# Patient Record
Sex: FEMALE | Race: AMERICAN INDIAN OR ALASKA NATIVE
[De-identification: names, ages, dates, MRNs, and addresses within clinical notes are randomized per-mention and may not be internally consistent; named-entity substitution may affect disease eponyms.]

---

## 2020-06-18 ENCOUNTER — HOSPITAL ENCOUNTER (EMERGENCY)
Dept: HOSPITAL 5 - ED | Age: 65
Discharge: HOME | End: 2020-06-18
Payer: SELF-PAY

## 2020-06-18 VITALS — DIASTOLIC BLOOD PRESSURE: 59 MMHG | SYSTOLIC BLOOD PRESSURE: 113 MMHG

## 2020-06-18 DIAGNOSIS — Z98.51: ICD-10-CM

## 2020-06-18 DIAGNOSIS — M94.0: Primary | ICD-10-CM

## 2020-06-18 DIAGNOSIS — R07.9: ICD-10-CM

## 2020-06-18 DIAGNOSIS — I10: ICD-10-CM

## 2020-06-18 LAB
ALBUMIN SERPL-MCNC: 4.3 G/DL (ref 3.9–5)
ALT SERPL-CCNC: 30 UNITS/L (ref 7–56)
APTT BLD: 28.2 SEC. (ref 24.2–36.6)
BASOPHILS # (AUTO): 0 K/MM3 (ref 0–0.1)
BASOPHILS NFR BLD AUTO: 0.9 % (ref 0–1.8)
BILIRUB DIRECT SERPL-MCNC: < 0.2 MG/DL (ref 0–0.2)
BUN SERPL-MCNC: 18 MG/DL (ref 7–17)
BUN/CREAT SERPL: 26 %
CALCIUM SERPL-MCNC: 9.9 MG/DL (ref 8.4–10.2)
EOSINOPHIL # BLD AUTO: 0.2 K/MM3 (ref 0–0.4)
EOSINOPHIL NFR BLD AUTO: 4 % (ref 0–4.3)
HCT VFR BLD CALC: 42.7 % (ref 30.3–42.9)
HEMOLYSIS INDEX: 18
HGB BLD-MCNC: 14.1 GM/DL (ref 10.1–14.3)
INR PPP: 0.95 (ref 0.87–1.13)
LYMPHOCYTES # BLD AUTO: 1.3 K/MM3 (ref 1.2–5.4)
LYMPHOCYTES NFR BLD AUTO: 29.1 % (ref 13.4–35)
MCHC RBC AUTO-ENTMCNC: 33 % (ref 30–34)
MCV RBC AUTO: 91 FL (ref 79–97)
MONOCYTES # (AUTO): 0.4 K/MM3 (ref 0–0.8)
MONOCYTES % (AUTO): 9.5 % (ref 0–7.3)
PLATELET # BLD: 215 K/MM3 (ref 140–440)
RBC # BLD AUTO: 4.71 M/MM3 (ref 3.65–5.03)

## 2020-06-18 PROCEDURE — 83880 ASSAY OF NATRIURETIC PEPTIDE: CPT

## 2020-06-18 PROCEDURE — 93005 ELECTROCARDIOGRAM TRACING: CPT

## 2020-06-18 PROCEDURE — 71045 X-RAY EXAM CHEST 1 VIEW: CPT

## 2020-06-18 PROCEDURE — 84484 ASSAY OF TROPONIN QUANT: CPT

## 2020-06-18 PROCEDURE — 36415 COLL VENOUS BLD VENIPUNCTURE: CPT

## 2020-06-18 PROCEDURE — 85025 COMPLETE CBC W/AUTO DIFF WBC: CPT

## 2020-06-18 PROCEDURE — 80076 HEPATIC FUNCTION PANEL: CPT

## 2020-06-18 PROCEDURE — 85730 THROMBOPLASTIN TIME PARTIAL: CPT

## 2020-06-18 PROCEDURE — 80048 BASIC METABOLIC PNL TOTAL CA: CPT

## 2020-06-18 PROCEDURE — 85610 PROTHROMBIN TIME: CPT

## 2020-06-18 NOTE — XRAY REPORT
CHEST 1 VIEW 



INDICATION:  Chest Pain.



COMPARISON:  None



FINDINGS:

Support devices: None. 



Heart: Within normal limits. 

Lungs/Pleura: No acute air space or interstitial disease. 



Additional findings: None.



IMPRESSION:

 No acute findings.



Signer Name: Dheeraj Bearden Jr, MD 

Signed: 6/18/2020 1:21 PM

Workstation Name: [id number]

## 2020-06-18 NOTE — EVENT NOTE
ED Screening Note


Date of service: 06/18/20 (nn)


Time: 12:59


ED Screening Note: 





This is a 64-year-old female presents the ED complaining of chest tightness, 

shortness of breath and productive coughing intermittently for the past couple 

weeks.





This initial assessment/diagnostic orders/clinical plan/treatment(s) is/are 

subject to change based on patients health status, clinical progression and re-

assessment by fellow clinical providers in the ED. Further treatment and workup 

at subsequent clinical providers discretion. Patient/guardian urged not to elope

from the ED as their condition may be serious if not clinically assessed and 

managed. 





Initial orders include: 


ekg, labs, cxr


main side eval

## 2020-06-18 NOTE — EMERGENCY DEPARTMENT REPORT
ED Chest Pain HPI





- General


Chief Complaint: Chest Pain


Stated Complaint: CHEST PAIN/COUGHING


Time Seen by Provider: 06/18/20 13:26


Source: patient


Mode of arrival: Ambulatory


Limitations: No Limitations





- History of Present Illness


Initial Comments: 





Patient is 64 years old female with history of hypertension.  Patient presented 

to the ER complaining of substernal chest pain for approximately 1 month now.  

Patient described her pain as aching and soreness.  Patient stated that pain 

increases when she started coughing.  Patient stated that she has been coughing 

greenish sputum.  Patient stated pain does not radiate.  She denied any 

shortness of breath.  Patient also denied any fever or chills.  Patient also 

denied any contact with patient with COVID-19.


MD Complaint: chest pain


-: month(s)


Onset: during rest


Pain Location: substernal


Quality: aching


Consistency: intermittent


Other Symptoms: cough





- Related Data


                                    Allergies











Allergy/AdvReac Type Severity Reaction Status Date / Time


 


No Known Allergies Allergy   Unverified 06/18/20 12:43














Heart Score





- HEART Score


History: Slightly suspicious


EKG: Non-specific


Age: 45-65


Risk factors: 1-2 risk factors


Troponin: < normal limit


HEART Score: 3





- Critical Actions


Critical Actions: 0-3 pts:0.9-1.7%risk of adverse cardiac event.Candidate for 

discharge





ED Review of Systems


ROS: 


Stated complaint: CHEST PAIN/COUGHING


Other details as noted in HPI





Comment: All other systems reviewed and negative


Constitutional: denies: chills, fever


Respiratory: cough.  denies: shortness of breath, SOB with exertion, SOB at 

rest, wheezing


Cardiovascular: chest pain.  denies: palpitations, dyspnea on exertion


Gastrointestinal: denies: abdominal pain, nausea, vomiting, diarrhea, 

constipation, hematemesis, melena, hematochezia


Musculoskeletal: denies: back pain


Neurological: denies: headache, weakness





ED Past Medical Hx





- Past Medical History


Previous Medical History?: Yes


Hx Hypertension: Yes





- Surgical History


Past Surgical History?: Yes


Additional Surgical History: tubal ligation





- Social History


Smoking Status: Never Smoker


Substance Use Type: None





ED Physical Exam





- General


Limitations: No Limitations


General appearance: alert, in no apparent distress





- Head


Head exam: Present: atraumatic, normocephalic, normal inspection





- Eye


Eye exam: Present: normal appearance, PERRL





- ENT


ENT exam: Present: normal exam, normal orophraynx, mucous membranes moist





- Neck


Neck exam: Present: normal inspection, full ROM.  Absent: tenderness, 

meningismus, lymphadenopathy, thyromegaly





- Respiratory


Respiratory exam: Present: normal lung sounds bilaterally, chest wall tenderness





- Cardiovascular


Cardiovascular Exam: Present: regular rate, normal rhythm, normal heart sounds





- GI/Abdominal


GI/Abdominal exam: Present: soft, normal bowel sounds.  Absent: distended, 

tenderness, guarding, rebound, rigid, organomegaly, mass, bruit, pulsatile mass,

hernia





- Extremities Exam


Extremities exam: Present: normal inspection, full ROM, normal capillary refill.

 Absent: pedal edema, calf tenderness





- Back Exam


Back exam: Present: normal inspection, full ROM.  Absent: CVA tenderness (R), 

CVA tenderness (L), muscle spasm, paraspinal tenderness, vertebral tenderness





- Neurological Exam


Neurological exam: Present: alert, oriented X3, CN II-XII intact, normal gait, 

reflexes normal.  Absent: motor sensory deficit





- Psychiatric


Psychiatric exam: Present: normal mood





- Skin


Skin exam: Present: warm, intact, normal color





ED Course


                                   Vital Signs











  06/18/20 06/18/20 06/18/20





  12:43 13:40 13:49


 


Temperature 97.9 F  


 


Pulse Rate 70  65


 


Respiratory 18  15





Rate   


 


Blood Pressure 152/83 159/80 


 


Blood Pressure   159/80





[Left]   


 


O2 Sat by Pulse 100 100 100





Oximetry   














  06/18/20 06/18/20 06/18/20





  13:50 14:00 15:00


 


Temperature   


 


Pulse Rate  69 61


 


Respiratory 17 14 16





Rate   


 


Blood Pressure  159/80 126/103


 


Blood Pressure   





[Left]   


 


O2 Sat by Pulse  100 100





Oximetry   














  06/18/20 06/18/20





  15:39 16:00


 


Temperature  


 


Pulse Rate  60


 


Respiratory  16





Rate  


 


Blood Pressure  138/73


 


Blood Pressure 133/95 





[Left]  


 


O2 Sat by Pulse  100





Oximetry  














ED Medical Decision Making





- Lab Data


Result diagrams: 


                                 06/18/20 13:39





                                 06/18/20 13:39





- EKG Data


-: EKG Interpreted by Me


EKG shows normal: sinus rhythm


Rate: normal





- EKG Data


Interpretation: no acute changes





- Radiology Data


Radiology results: report reviewed





- Medical Decision Making





Patient is 64 years old female with history of hypertension.  Patient presented 

to the ER complaining of substernal chest pain for approximately 1 month now.  

Patient described her pain as aching and soreness.  Patient stated that pain 

increases when she started coughing.  Patient stated that she has been coughing 

greenish sputum.  Patient stated pain does not radiate.  She denied any 

shortness of breath.  Patient also denied any fever or chills.  Patient also 

denied any contact with patient with COVID-19.





EKG showed no ST elevation or depression.  Labs reviewed and is unremarkable 

including 2- troponin.  Patient chest pain is reproducible and it has been going

 on for more than a month now.  Patient also reported cough and increasing of 

the pain when she cough.  I believe patient pain is most likely musculoskeletal 

due to chronic cough however cardiac origin cannot be excluded and patient 

strongly advised to follow-up with her primary care physician for outpatient 

cardiac work-up.  Patient given medication for cough and pain and advised to 

return to the ER if she develop any new symptoms or if her symptoms get worse.


Critical care attestation.: 


If time is entered above; I have spent that time in minutes in the direct care 

of this critically ill patient, excluding procedure time.








ED Disposition


Clinical Impression: 


 Chest pain, Costochondritis, acute





Disposition: DC-01 TO HOME OR SELFCARE


Is pt being admited?: No


Condition: Stable


Instructions:  Chest Pain (ED), Costochondritis (ED)


Referrals: 


PRIMARY CARE,MD [Primary Care Provider] - 3-5 Days

## 2021-05-23 ENCOUNTER — HOSPITAL ENCOUNTER (EMERGENCY)
Dept: HOSPITAL 5 - ED | Age: 66
Discharge: HOME | End: 2021-05-23
Payer: COMMERCIAL

## 2021-05-23 VITALS — DIASTOLIC BLOOD PRESSURE: 79 MMHG | SYSTOLIC BLOOD PRESSURE: 169 MMHG

## 2021-05-23 DIAGNOSIS — Z98.51: ICD-10-CM

## 2021-05-23 DIAGNOSIS — R60.0: Primary | ICD-10-CM

## 2021-05-23 DIAGNOSIS — I10: ICD-10-CM

## 2021-05-23 DIAGNOSIS — Z79.899: ICD-10-CM

## 2021-05-23 LAB
ALBUMIN SERPL-MCNC: 4.5 G/DL (ref 3.9–5)
ALT SERPL-CCNC: 24 UNITS/L (ref 7–56)
APTT BLD: 32.1 SEC. (ref 24.2–36.6)
BASOPHILS # (AUTO): 0.1 K/MM3 (ref 0–0.1)
BASOPHILS NFR BLD AUTO: 0.9 % (ref 0–1.8)
BUN SERPL-MCNC: 15 MG/DL (ref 7–17)
BUN/CREAT SERPL: 21 %
CALCIUM SERPL-MCNC: 9.5 MG/DL (ref 8.4–10.2)
EOSINOPHIL # BLD AUTO: 0.3 K/MM3 (ref 0–0.4)
EOSINOPHIL NFR BLD AUTO: 4.5 % (ref 0–4.3)
HCT VFR BLD CALC: 40.1 % (ref 30.3–42.9)
HEMOLYSIS INDEX: 5
HGB BLD-MCNC: 13.5 GM/DL (ref 10.1–14.3)
INR PPP: 1.03 (ref 0.87–1.13)
LYMPHOCYTES # BLD AUTO: 1.5 K/MM3 (ref 1.2–5.4)
LYMPHOCYTES NFR BLD AUTO: 23.4 % (ref 13.4–35)
MCHC RBC AUTO-ENTMCNC: 34 % (ref 30–34)
MCV RBC AUTO: 91 FL (ref 79–97)
MONOCYTES # (AUTO): 0.6 K/MM3 (ref 0–0.8)
MONOCYTES % (AUTO): 9.6 % (ref 0–7.3)
PLATELET # BLD: 240 K/MM3 (ref 140–440)
RBC # BLD AUTO: 4.4 M/MM3 (ref 3.65–5.03)

## 2021-05-23 PROCEDURE — 80053 COMPREHEN METABOLIC PANEL: CPT

## 2021-05-23 PROCEDURE — 85025 COMPLETE CBC W/AUTO DIFF WBC: CPT

## 2021-05-23 PROCEDURE — 84484 ASSAY OF TROPONIN QUANT: CPT

## 2021-05-23 PROCEDURE — 85610 PROTHROMBIN TIME: CPT

## 2021-05-23 PROCEDURE — 36415 COLL VENOUS BLD VENIPUNCTURE: CPT

## 2021-05-23 PROCEDURE — 83880 ASSAY OF NATRIURETIC PEPTIDE: CPT

## 2021-05-23 PROCEDURE — 85730 THROMBOPLASTIN TIME PARTIAL: CPT

## 2021-05-23 PROCEDURE — 71046 X-RAY EXAM CHEST 2 VIEWS: CPT

## 2021-05-23 NOTE — EMERGENCY DEPARTMENT REPORT
ED Extremity Problem HPI





- General


Chief complaint: Extremity Injury, Lower


Stated complaint: LEGS HAVE FLUID ON THEM


Time Seen by Provider: 21 15:25


Source: patient


Mode of arrival: Ambulatory


Limitations: No Limitations





- History of Present Illness


Initial comments: 


65-year-old female with a past medical history of hypertension and chronic 

peripheral edema presented to the ER this morning around 11:00 with complaints 

of bilateral lower extremity swelling.  Patient's states that she had increasing

swelling to her a lower extremity over the past 3 days.  She reports heaviness 

to both legs but otherwise no significant pain.  Patient also reports that she 

has been out of her blood pressure medication for the past 2 days.  She did call

to get it refilled but has not picked it up yet she states that she came to the 

ER first to have the swelling in her legs checked out as she has never had it 

swell as much as it has in the past 3 days.  She denies any associated chest 

pain or shortness of breath.  She denies any GI or  symptoms.  She denies any 

history of congestive heart failure, history of PE or DVT, or kidney disease. 





MD Complaint: extremity swelling


-: days(s) (3 )


Location: bilateral lower extremity


History of Same: Yes





- Related Data


                                  Previous Rx's











 Medication  Instructions  Recorded  Last Taken  Type


 


Naproxen [Naprosyn] 500 mg PO BID #14 tablet 20 Unknown Rx


 


guaiFENesin [Robitussin] 10 ml PO TID PRN #100 ml 20 Unknown Rx











                                    Allergies











Allergy/AdvReac Type Severity Reaction Status Date / Time


 


No Known Allergies Allergy   Unverified 20 12:43














ED Review of Systems


ROS: 


Stated complaint: LEGS HAVE FLUID ON THEM


Other details as noted in HPI





Comment: All other systems reviewed and negative


Constitutional: denies: chills, fever


Eyes: denies: eye pain, eye discharge, vision change


ENT: denies: ear pain, throat pain, hearing loss, epistaxis, congestion


Respiratory: denies: cough, shortness of breath, SOB with exertion, SOB at rest,

 wheezing


Cardiovascular: edema.  denies: chest pain, palpitations, dyspnea on exertion, 

syncope, paroxysmal nocturnal dyspnea


Gastrointestinal: denies: abdominal pain, nausea, vomiting, diarrhea, 

constipation, hematemesis, hematochezia


Genitourinary: denies: urgency, dysuria, frequency, hematuria, discharge, 

abnormal menses, dyspareunia


Musculoskeletal: denies: back pain, joint swelling, arthralgia


Skin: denies: rash, lesions, change in color, change in hair/nails, pruritus


Neurological: denies: headache, numbness, paresthesias, confusion, abnormal 

gait, vertigo


Psychiatric: denies: anxiety, depression, auditory hallucinations, visual 

hallucinations, homicidal thoughts, suicidal thoughts


Hematological/Lymphatic: denies: easy bleeding, easy bruising, swollen glands





ED Past Medical Hx





- Past Medical History


Previous Medical History?: Yes


Hx Hypertension: Yes





- Surgical History


Additional Surgical History: tubal ligation





- Social History


Smoking Status: Never Smoker


Substance Use Type: None





- Medications


Home Medications: 


                                Home Medications











 Medication  Instructions  Recorded  Confirmed  Last Taken  Type


 


Naproxen [Naprosyn] 500 mg PO BID #14 tablet 20  Unknown Rx


 


guaiFENesin [Robitussin] 10 ml PO TID PRN #100 ml 20  Unknown Rx














ED Physical Exam





- General


Limitations: No Limitations


General appearance: alert, in no apparent distress





- Head


Head exam: Present: atraumatic, normocephalic, normal inspection





- Eye


Eye exam: Present: normal appearance, PERRL, EOMI


Pupils: Present: normal accommodation





- Respiratory


Respiratory exam: Present: normal lung sounds bilaterally.  Absent: respiratory 

distress, wheezes, rales, rhonchi





- Cardiovascular


Cardiovascular Exam: Present: regular rate, normal rhythm, normal heart sounds





- GI/Abdominal


GI/Abdominal exam: Present: soft.  Absent: distended, tenderness, guarding, 

rebound





- Extremities Exam


Extremities exam: Present: full ROM, normal capillary refill, pedal edema, joint

 swelling, other (pt has mild soft swelling noted to both legs; no pitting; no 

ttp to legs or calf. She does have multiple varicose veins noted to the lower 

extremities.  No erythema or bruising or any other skin discoloration noted.  

Dorsalis pedis pulse and posterior status pulse normal.  Cap refills normal).  

Absent: calf tenderness (.)





- Neurological Exam


Neurological exam: Present: alert, oriented X3, CN II-XII intact, normal gait





- Psychiatric


Psychiatric exam: Present: normal affect, normal mood





- Skin


Skin exam: Present: intact





ED Course


                                   Vital Signs











  21





  15:12


 


Temperature 98.3 F


 


Pulse Rate 67


 


Respiratory 18





Rate 


 


Blood Pressure 169/79


 


O2 Sat by Pulse 99





Oximetry 














ED Medical Decision Making





- Lab Data


Result diagrams: 


                                 21 15:46





                                 21 15:46





- Radiology Data


Radiology results: report reviewed


Patient: FREDERICK MONTERO                                                      

          MR#:   


[id number]          


: 1955                                                                

Acct:[id number]      


 


Age/Sex: 65 / F                                                                

ADM Date: 21     


 


Loc: ED       


Attending Dr:   


 


 


Ordering Physician: ANASTASIA PATEL NP  


Date of Service: 21  


Procedure(s): XR chest routine 2V  


Accession Number(s): [id number]  


 


cc: ANASTASIA PATEL NP   


 


Fluoro Time In Minutes:   


 


CHEST 2 VIEWS   


 


 INDICATION / CLINICAL INFORMATION:  


 Chest Pain.  


 


 COMPARISON:   


 2020.  


 


 FINDINGS:  


 


 SUPPORT DEVICES: None.  


 


 HEART / MEDIASTINUM: No significant abnormality.   


 


 LUNGS / PLEURA: No significant pulmonary or pleural abnormality. No 

pneumothorax.   


 


 ADDITIONAL FINDINGS: No significant additional findings.  


 


 IMPRESSION:  


 No acute cardiopulmonary abnormality.  


 


 Signer Name: Yovani Kramer MD   


 Signed: 2021 3:51 PM  


 Workstation Name: [id number]   


 


 


Transcribed By: SS  


Dictated By: YOVANI KRAMER  


Electronically Authenticated By: YOVANI KRAMER    


Signed Date/Time: 21 155                                


 


 


 


DD/DT: 21                                                            

  


TD/TT:








- Medical Decision Making


Patient presented to the ER this morning around 11 AM with complaints of 

bilateral lower extremity swelling.  She has chronic peripheral edema but 

noticed in the past 3 days it has increased more than typical.  She reports 

heaviness to the leg secondary to the swelling but otherwise no pain and she has

 not had any shortness of breath or chest pain.  


Labs were ordered as part of screening exam.


Labs reviewed and shows nothing acute.


Chest x-ray shows nothing acute.


Physical exam shows that patient do have large legs but she does have associated

 mild soft swelling to both legs with associated varicose veins to both legs but

 no calf tenderness or any tenderness to her lower leg, she has no associated 

cellulitis, or any other skin discoloration.  Distal pulses are normal.  Her 

gait is slow but normal.  


Her history, physical exam and diagnostic testing does not suggest acute 

arterial occlusion, DVT, cellulitis, or other bacterial infection, CHF, renal 

failure or any other acute emergent conditions requiring additional work-up or 

admission at this time.


Patient is not toxic or ill-appearing.  She is not in any acute distress.  

Repeat blood pressure showed some improvement at 159/75. Patient is requesting a

 dose of her blood pressure medication before she leaves here tonight.  She does

 have a refill at the pharmacy but will be able to pick it up tomorrow repeat.  

She was given a dose of the HCTZ and the lisinopril tonight and was given the 

metoprolol to take in the morning but she was instructed to get her medications 

filled and start taking as prescribed and she was also instructed to elevate her

 leg as much as possible. 


Patient will be given referral to local primary care doctor.  Patient was stable

 at time of discharge.


Critical care attestation.: 


If time is entered above; I have spent that time in minutes in the direct care 

of this critically ill patient, excluding procedure time.








ED Disposition


Clinical Impression: 


 Peripheral edema





Disposition: DC-01 TO HOME OR SELFCARE


Is pt being admited?: No


Does the pt Need Aspirin: No


Condition: Stable


Instructions:  Peripheral Edema


Additional Instructions: 


I recommend that you getting your blood pressure medication tomorrow from 

pharmacy and start taking it as prescribed. Elevate your leg as often as 

possible. Return to ED if symptoms changes or worsen in any way. 


Referrals: 


DAV BYRANT MD [Staff Physician] - 3-5 Days


PRIMARY CARE,MD [Primary Care Provider] - 3-5 Days


Time of Disposition: 21:25

## 2021-05-23 NOTE — EMERGENCY DEPARTMENT REPORT
Blank Doc





- Documentation


Documentation: 





65-year-old female that presents with bilateral leg swelling.  Patient stated is

not compliant with her medication.








1- This initial assessment/diagnostic orders/clinical plan/ treatment(s) is/are 

subject to change based on pt's health status, clinical progression and re-asses

sment by fellow clinical providers in the ED. Further treatment and workup at 

subsequent clinical provers discretion. Patient/guardians urged not to elope 

from ED as their condition may be serious if not clinically assessed and 

managed. 


2-labs with EKG

## 2021-05-23 NOTE — EMERGENCY DEPARTMENT REPORT
ED General Adult HPI





- General


Chief complaint: Extremity Injury, Lower


Stated complaint: LEGS HAVE FLUID ON THEM


Time Seen by Provider: 05/23/21 15:25


Source: patient


Mode of arrival: Ambulatory


Limitations: No Limitations





- History of Present Illness


MD Complaint: Bilateral LE swelling 


-: days(s) (3)





- Related Data


                                  Previous Rx's











 Medication  Instructions  Recorded  Last Taken  Type


 


Naproxen [Naprosyn] 500 mg PO BID #14 tablet 06/18/20 Unknown Rx


 


guaiFENesin [Robitussin] 10 ml PO TID PRN #100 ml 06/18/20 Unknown Rx











                                    Allergies











Allergy/AdvReac Type Severity Reaction Status Date / Time


 


No Known Allergies Allergy   Unverified 06/18/20 12:43














ED Review of Systems


ROS: 


Stated complaint: LEGS HAVE FLUID ON THEM


Other details as noted in HPI








ED Past Medical Hx





- Past Medical History


Previous Medical History?: Yes


Hx Hypertension: Yes





- Surgical History


Additional Surgical History: tubal ligation





- Social History


Smoking Status: Never Smoker


Substance Use Type: None





- Medications


Home Medications: 


                                Home Medications











 Medication  Instructions  Recorded  Confirmed  Last Taken  Type


 


Naproxen [Naprosyn] 500 mg PO BID #14 tablet 06/18/20  Unknown Rx


 


guaiFENesin [Robitussin] 10 ml PO TID PRN #100 ml 06/18/20  Unknown Rx














ED Physical Exam





- General


Limitations: No Limitations





ED Course





                                   Vital Signs











  05/23/21





  15:12


 


Temperature 98.3 F


 


Pulse Rate 67


 


Respiratory 18





Rate 


 


Blood Pressure 169/79


 


O2 Sat by Pulse 99





Oximetry 














ED Medical Decision Making





- Lab Data


Result diagrams: 


                                 05/23/21 15:46





                                 05/23/21 15:46


Critical care attestation.: 


If time is entered above; I have spent that time in minutes in the direct care 

of this critically ill patient, excluding procedure time.








ED Disposition


Condition: Stable


Referrals: 


PRIMARY CARE,MD [Primary Care Provider] - 3-5 Days

## 2021-05-23 NOTE — XRAY REPORT
CHEST 2 VIEWS 



INDICATION / CLINICAL INFORMATION:

Chest Pain.



COMPARISON: 

6/18/2020.



FINDINGS:



SUPPORT DEVICES: None.



HEART / MEDIASTINUM: No significant abnormality. 



LUNGS / PLEURA: No significant pulmonary or pleural abnormality. No pneumothorax. 



ADDITIONAL FINDINGS: No significant additional findings.



IMPRESSION:

No acute cardiopulmonary abnormality.



Signer Name: Omar Kramer MD 

Signed: 5/23/2021 3:51 PM

Workstation Name: [id number]